# Patient Record
(demographics unavailable — no encounter records)

---

## 2024-10-17 NOTE — PHYSICAL EXAM
[FreeTextEntry1] : Awake, alert, oriented x 3.  Language fluent.  Comprehension intact.  Naming intact.  Repetition intact.  Affect normal.  Cranial nerves grossly intact.  Motor exam: normal bulk, normal tone, 5/5 in all four extremities.  No tremors or fasciculations.  Sensory exam: intact to LT/PP/ROSS/vibration.  DTRs: 2+ throughout, flexor plantar response bilaterally, no clonus.  Coordination: no dysmetria.  Gait: normal toe/heel/tandem gait.  Romberg - negative

## 2024-10-17 NOTE — HISTORY OF PRESENT ILLNESS
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 Mild short-term memory problems, suspect MCI.  Since last visit,    Current AEDs: LEV 1500mg PO BID  Previous AEDs: none

## 2025-07-30 NOTE — ASSESSMENT
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 Mild short-term memory problems, suspect MCI.  Plan: 1. Continue LEV ER 750mg in AM and 1500mg in PM 2. Continue LCM 100mg PO BID 3. Seizure precautions, including no driving or operating heavy equipment, no unsupervised swimming, using a shower instead of a bathtub, no climbing ladders or working at elevations, no use of sharp dangerous tools or devices. 4. Patient agrees with plan. 5. Follow up in 6 months.  Jayesh Torres MD [facility]  I have spent 30 minutes of time for this encounter.  More than 50% of time spent counseling and educating patient about epilepsy specific safety issues including ASM side effects and interactions, alcohol consumption, sleep deprivation, risks and driving privileges associated with the [place] Guidelines, what is SUDEP and death related to seizures/SUDEP, seizure 1st aid and risks. Patient is educated on seizure precautions, including no driving, no operating machinery, no swimming or bathing, no climbing heights, or engage in any risky activities during which a seizure could cause further injury to pt or others.

## 2025-07-30 NOTE — PHYSICAL EXAM
[FreeTextEntry1] : MMSE = 30/30  Awake, alert, oriented x 3. Language fluent. Comprehension intact. Naming intact. Repetition intact. Affect normal. Cranial nerves grossly intact. Motor exam: normal bulk, normal tone, 5/5 in all four extremities. No tremors or fasciculations. Sensory exam: intact to LT/PP/ROSS/vibration. DTRs: 2+ throughout, flexor plantar response bilaterally, no clonus. Coordination: no dysmetria. Gait: normal toe/heel/tandem gait. Romberg - negative.

## 2025-07-30 NOTE — HISTORY OF PRESENT ILLNESS
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 Mild short-term memory problems, suspect MCI.  Since last visit, no breakthrough seizures, and no side effects from AEDs.  She occasionally gets brief episodes of mild vertigo for which she takes meclizine prn.    She feels she is forgetful, but likely related to stress.  Family reports her memory seems normal in general.   Current AEDs: LEV 750mg in AM and 1500mg in PM LCM 100mg PO BID  Previous AEDs: none

## 2025-07-30 NOTE — ASSESSMENT
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 Mild short-term memory problems, suspect MCI.  Plan: 1. Continue LEV ER 750mg in AM and 1500mg in PM 2. Continue LCM 100mg PO BID 3. Seizure precautions, including no driving or operating heavy equipment, no unsupervised swimming, using a shower instead of a bathtub, no climbing ladders or working at elevations, no use of sharp dangerous tools or devices. 4. Patient agrees with plan. 5. Follow up in 6 months.  Jayesh Torres MD [facility]  I have spent 30 minutes of time for this encounter.  More than 50% of time spent counseling and educating patient about epilepsy specific safety issues including ASM side effects and interactions, alcohol consumption, sleep deprivation, risks and driving privileges associated with the [place] Guidelines, what is SUDEP and death related to seizures/SUDEP, seizure 1st aid and risks. Patient is educated on seizure precautions, including no driving, no operating machinery, no swimming or bathing, no climbing heights, or engage in any risky activities during which a seizure could cause further injury to pt or others.